# Patient Record
Sex: FEMALE | Race: WHITE | Employment: UNEMPLOYED | URBAN - METROPOLITAN AREA
[De-identification: names, ages, dates, MRNs, and addresses within clinical notes are randomized per-mention and may not be internally consistent; named-entity substitution may affect disease eponyms.]

---
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
## 2023-02-08 ENCOUNTER — HOSPITAL ENCOUNTER (EMERGENCY)
Facility: HOSPITAL | Age: 18
Discharge: HOME | End: 2023-02-08
Attending: EMERGENCY MEDICINE
Payer: MEDICAID

## 2023-02-08 VITALS
OXYGEN SATURATION: 97 % | HEART RATE: 93 BPM | BODY MASS INDEX: 51 KG/M2 | WEIGHT: 293 LBS | DIASTOLIC BLOOD PRESSURE: 55 MMHG | TEMPERATURE: 100 F | SYSTOLIC BLOOD PRESSURE: 112 MMHG | RESPIRATION RATE: 20 BRPM

## 2023-02-08 DIAGNOSIS — J02.0 STREP PHARYNGITIS: Primary | ICD-10-CM

## 2023-02-08 LAB — S PYO AG THROAT QL: POSITIVE

## 2023-02-08 PROCEDURE — 99283 EMERGENCY DEPT VISIT LOW MDM: CPT

## 2023-02-08 PROCEDURE — 87880 STREP A ASSAY W/OPTIC: CPT

## 2023-02-08 RX ORDER — PENICILLIN V POTASSIUM 500 MG/1
500 TABLET ORAL 3 TIMES DAILY
Qty: 30 TABLET | Refills: 0 | Status: SHIPPED | OUTPATIENT
Start: 2023-02-08 | End: 2023-02-18

## 2023-02-08 RX ORDER — IBUPROFEN 400 MG/1
400 TABLET ORAL ONCE
Status: COMPLETED | OUTPATIENT
Start: 2023-02-08 | End: 2023-02-08

## 2023-02-08 NOTE — ED INITIAL ASSESSMENT (HPI)
Patient complains of throat pain since yesterday, states there is a \"bump\" on the left side, also complains of chills

## 2023-02-08 NOTE — DISCHARGE INSTRUCTIONS
Take antibiotics as prescribed. Take ibuprofen or Tylenol as needed for pain or fever. Follow-up with your primary physician. Return to the emergency department if difficulty swallowing, difficulty breathing, or other new symptoms develop.

## 2024-06-16 ENCOUNTER — HOSPITAL ENCOUNTER (EMERGENCY)
Facility: HOSPITAL | Age: 19
Discharge: HOME | End: 2024-06-16
Attending: EMERGENCY MEDICINE

## 2024-06-16 ENCOUNTER — APPOINTMENT (OUTPATIENT)
Dept: GENERAL RADIOLOGY | Facility: HOSPITAL | Age: 19
End: 2024-06-16
Attending: EMERGENCY MEDICINE

## 2024-06-16 VITALS
SYSTOLIC BLOOD PRESSURE: 110 MMHG | OXYGEN SATURATION: 98 % | RESPIRATION RATE: 18 BRPM | BODY MASS INDEX: 50.02 KG/M2 | DIASTOLIC BLOOD PRESSURE: 65 MMHG | TEMPERATURE: 98 F | HEART RATE: 85 BPM | HEIGHT: 64 IN | WEIGHT: 293 LBS

## 2024-06-16 DIAGNOSIS — S29.011A MUSCLE STRAIN OF CHEST WALL, INITIAL ENCOUNTER: Primary | ICD-10-CM

## 2024-06-16 PROCEDURE — 71101 X-RAY EXAM UNILAT RIBS/CHEST: CPT | Performed by: EMERGENCY MEDICINE

## 2024-06-16 PROCEDURE — 99283 EMERGENCY DEPT VISIT LOW MDM: CPT

## 2024-06-16 PROCEDURE — 99284 EMERGENCY DEPT VISIT MOD MDM: CPT

## 2024-06-16 RX ORDER — NAPROXEN 500 MG/1
500 TABLET ORAL 2 TIMES DAILY PRN
Qty: 14 TABLET | Refills: 0 | Status: SHIPPED | OUTPATIENT
Start: 2024-06-16 | End: 2024-06-23

## 2024-06-16 NOTE — ED PROVIDER NOTES
Patient Seen in: [facility] Emergency Department      History     Chief Complaint   Patient presents with    Pain     Stated Complaint: r side rib pain    Subjective:   HPI    Patient is a 18-year-old female that complains of right-sided rib pain.  Is been present for about 2 weeks.  She is she is tender in the right lateral ribs.  Pain seems to radiate underneath her right breast.  It is worse when she moves it is worse when she twists it is worse when she coughs  Patient states about 2 weeks ago when it started she had a bad cough.  She smokes and coughs frequently.  She recalls no other injury other than bad cough.    Objective:   History reviewed. No pertinent past medical history.           History reviewed. No pertinent surgical history.             Social History     Socioeconomic History    Marital status: Single   Tobacco Use    Smoking status: Never    Smokeless tobacco: Never   Substance and Sexual Activity    Drug use: Yes     Types: Cannabis              Review of Systems    Positive for stated complaint: r side rib pain  Other systems are as noted in HPI.  Constitutional and vital signs reviewed.      All other systems reviewed and negative except as noted above.    Physical Exam     ED Triage Vitals [06/16/24 1058]   /76   Pulse 90   Resp 16   Temp 98 °F (36.7 °C)   Temp src Temporal   SpO2 97 %   O2 Device None (Room air)       Current Vitals:   Vital Signs  BP: 104/76  Pulse: 90  Resp: 16  Temp: 98 °F (36.7 °C)  Temp src: Temporal    Oxygen Therapy  SpO2: 97 %  O2 Device: None (Room air)            Physical Exam    Constitutional: Oriented to person, place, and time. Appears well-developed and well-nourished.   HEENT:   Head: Normocephalic and atraumatic.   Right Ear: External ear normal.   Left Ear: External ear normal.   Nose: Nose normal.   Mouth/Throat: Oropharynx is clear and moist.   Eyes: Conjunctivae and EOM are normal. Pupils are equal, round, and reactive to light.   Neck:  Neck supple.   Cardiovascular: Normal rate, regular rhythm, normal heart sounds and intact distal pulses.    Pulmonary/Chest: Effort normal and breath sounds normal. No respiratory distress.  Tender right lateral rib cage.  Abdominal: Soft. Bowel sounds are normal. Exhibits no distension and no mass. There is no tenderness. There is no rebound and no guarding.   Musculoskeletal: Normal range of motion. Exhibits no edema or tenderness.   Lymphadenopathy: No cervical adenopathy.   Neurological: Alert and oriented to person, place, and time. Normal reflexes. No cranial nerve deficit. No motor os sensory defecits noted Coordination normal.   Skin: Skin is warm and dry.   Psychiatric: Normal mood and affect. Behavior is normal. Judgment and thought content normal.   Nursing note and vitals reviewed.        ED Course   Labs Reviewed - No data to display                   MDM      Use of independent historian:     I personally reviewed and interpreted the images :     XR RIBS WITH CHEST (3 VIEWS), RIGHT  (CPT=71101)    Result Date: 6/16/2024  CONCLUSION: No acute cardiopulmonary abnormality.  No acute right rib fracture or deformity.   Dictated by (CST): Saji Oswald MD on 6/16/2024 at 12:23 PM     Finalized by (CST): Saji Oswald MD on 6/16/2024 at 12:24 PM           Vitals:    06/16/24 1058   BP: 104/76   Pulse: 90   Resp: 16   Temp: 98 °F (36.7 °C)   TempSrc: Temporal   SpO2: 97%   Weight: 136.1 kg   Height: 162.6 cm (5' 4\")     *I personally reviewed and interpreted all ED vitals.    Pulse Ox: 97%, Room air, Normal         Differential Diagnosis/ Diagnostic Considerations: Right-sided chest wall pain consider muscle strain consider rib fracture doubt pneumonia doubt pneumothorax will check x-rays    Medical Record Review: I personally reviewed available prior medical records for any recent pertinent discharge summaries, testing, and procedures and reviewed those reports and found .    Complicating Factors: The patient  already has smoking history which contribute to the complexity of this ED evaluation.    Social determinants of health:    Prescription drug management:      Shared Decision Making:    ED Course: X-ray findings shared with patient will discharge home with prescription for Naprosyn patient is in agreement with plan warning signs on return discussed will discharge    Discussion of management with other healthcare providers:    Condition upon leaving the department: Stable                                     Medical Decision Making      Disposition and Plan     Clinical Impression:  1. Muscle strain of chest wall, initial encounter         Disposition:  Discharge  6/16/2024 12:43 pm    Follow-up:  Colten Marcos MD  [address] [place] [id number]  [phone]    Follow up in 3 day(s)            Medications Prescribed:  Current Discharge Medication List        START taking these medications    Details   naproxen 500 MG Oral Tab Take 1 tablet (500 mg total) by mouth 2 (two) times daily as needed.  Qty: 14 tablet, Refills: 0

## 2024-11-24 ENCOUNTER — HOSPITAL ENCOUNTER (EMERGENCY)
Facility: HOSPITAL | Age: 19
Discharge: HOME | End: 2024-11-25
Attending: EMERGENCY MEDICINE
Payer: MEDICAID

## 2024-11-24 DIAGNOSIS — K29.00 ACUTE GASTRITIS WITHOUT HEMORRHAGE, UNSPECIFIED GASTRITIS TYPE: Primary | ICD-10-CM

## 2024-11-24 LAB
B-HCG UR QL: NEGATIVE
BILIRUB UR QL: NEGATIVE
CLARITY UR: CLEAR
COLOR UR: YELLOW
GLUCOSE UR-MCNC: NORMAL MG/DL
HGB UR QL STRIP.AUTO: NEGATIVE
KETONES UR-MCNC: 150 MG/DL
LEUKOCYTE ESTERASE UR QL STRIP.AUTO: NEGATIVE
NITRITE UR QL STRIP.AUTO: NEGATIVE
PH UR: 6 [PH] (ref 5–8)
SP GR UR STRIP: 1.02 (ref 1–1.03)
UROBILINOGEN UR STRIP-ACNC: NORMAL

## 2024-11-24 PROCEDURE — 96361 HYDRATE IV INFUSION ADD-ON: CPT

## 2024-11-24 PROCEDURE — 80048 BASIC METABOLIC PNL TOTAL CA: CPT | Performed by: EMERGENCY MEDICINE

## 2024-11-24 PROCEDURE — 96375 TX/PRO/DX INJ NEW DRUG ADDON: CPT

## 2024-11-24 PROCEDURE — 85060 BLOOD SMEAR INTERPRETATION: CPT | Performed by: EMERGENCY MEDICINE

## 2024-11-24 PROCEDURE — 99284 EMERGENCY DEPT VISIT MOD MDM: CPT

## 2024-11-24 PROCEDURE — 81025 URINE PREGNANCY TEST: CPT

## 2024-11-24 PROCEDURE — 85025 COMPLETE CBC W/AUTO DIFF WBC: CPT | Performed by: EMERGENCY MEDICINE

## 2024-11-24 PROCEDURE — 96374 THER/PROPH/DIAG INJ IV PUSH: CPT

## 2024-11-24 PROCEDURE — 81003 URINALYSIS AUTO W/O SCOPE: CPT | Performed by: EMERGENCY MEDICINE

## 2024-11-24 PROCEDURE — 83690 ASSAY OF LIPASE: CPT | Performed by: EMERGENCY MEDICINE

## 2024-11-24 PROCEDURE — 80076 HEPATIC FUNCTION PANEL: CPT | Performed by: EMERGENCY MEDICINE

## 2024-11-24 PROCEDURE — 99285 EMERGENCY DEPT VISIT HI MDM: CPT

## 2024-11-24 RX ORDER — ONDANSETRON 2 MG/ML
4 INJECTION INTRAMUSCULAR; INTRAVENOUS ONCE
Status: COMPLETED | OUTPATIENT
Start: 2024-11-24 | End: 2024-11-24

## 2024-11-24 RX ORDER — KETOROLAC TROMETHAMINE 15 MG/ML
15 INJECTION, SOLUTION INTRAMUSCULAR; INTRAVENOUS ONCE
Status: COMPLETED | OUTPATIENT
Start: 2024-11-24 | End: 2024-11-24

## 2024-11-24 RX ORDER — METOCLOPRAMIDE 10 MG/1
10 TABLET ORAL 3 TIMES DAILY PRN
Qty: 10 TABLET | Refills: 0 | Status: SHIPPED | OUTPATIENT
Start: 2024-11-24

## 2024-11-25 ENCOUNTER — APPOINTMENT (OUTPATIENT)
Dept: CT IMAGING | Facility: HOSPITAL | Age: 19
End: 2024-11-25
Attending: EMERGENCY MEDICINE
Payer: MEDICAID

## 2024-11-25 VITALS
BODY MASS INDEX: 48.65 KG/M2 | SYSTOLIC BLOOD PRESSURE: 123 MMHG | DIASTOLIC BLOOD PRESSURE: 77 MMHG | HEIGHT: 64 IN | RESPIRATION RATE: 20 BRPM | HEART RATE: 70 BPM | OXYGEN SATURATION: 100 % | TEMPERATURE: 99 F | WEIGHT: 285 LBS

## 2024-11-25 LAB
ALBUMIN SERPL-MCNC: 4.8 G/DL (ref 3.2–4.8)
ALP LIVER SERPL-CCNC: 97 U/L
ALT SERPL-CCNC: 75 U/L
ANION GAP SERPL CALC-SCNC: 10 MMOL/L (ref 0–18)
AST SERPL-CCNC: 15 U/L (ref ?–34)
BASOPHILS # BLD AUTO: 0.09 X10(3) UL (ref 0–0.2)
BASOPHILS NFR BLD AUTO: 0.6 %
BILIRUB DIRECT SERPL-MCNC: 0.6 MG/DL (ref ?–0.3)
BILIRUB SERPL-MCNC: 1.3 MG/DL (ref 0.3–1.2)
BUN BLD-MCNC: 10 MG/DL (ref 9–23)
BUN/CREAT SERPL: 10.5 (ref 10–20)
CALCIUM BLD-MCNC: 10.3 MG/DL (ref 8.7–10.4)
CHLORIDE SERPL-SCNC: 100 MMOL/L (ref 98–112)
CO2 SERPL-SCNC: 28 MMOL/L (ref 21–32)
CREAT BLD-MCNC: 0.95 MG/DL
DEPRECATED RDW RBC AUTO: 36.8 FL (ref 35.1–46.3)
EGFRCR SERPLBLD CKD-EPI 2021: 87 ML/MIN/1.73M2 (ref 60–?)
EOSINOPHIL # BLD AUTO: 0.07 X10(3) UL (ref 0–0.7)
EOSINOPHIL NFR BLD AUTO: 0.5 %
ERYTHROCYTE [DISTWIDTH] IN BLOOD BY AUTOMATED COUNT: 12.4 % (ref 11–15)
GLUCOSE BLD-MCNC: 106 MG/DL (ref 70–99)
HCT VFR BLD AUTO: 46.2 %
HGB BLD-MCNC: 15.7 G/DL
IMM GRANULOCYTES # BLD AUTO: 0.16 X10(3) UL (ref 0–1)
IMM GRANULOCYTES NFR BLD: 1 %
LIPASE SERPL-CCNC: 56 U/L (ref 12–53)
LYMPHOCYTES # BLD AUTO: 2.03 X10(3) UL (ref 1–4)
LYMPHOCYTES NFR BLD AUTO: 13.2 %
MCH RBC QN AUTO: 27.7 PG (ref 26–34)
MCHC RBC AUTO-ENTMCNC: 34 G/DL (ref 31–37)
MCV RBC AUTO: 81.5 FL
MONOCYTES # BLD AUTO: 1.03 X10(3) UL (ref 0.1–1)
MONOCYTES NFR BLD AUTO: 6.7 %
NEUTROPHILS # BLD AUTO: 12.01 X10 (3) UL (ref 1.5–7.7)
NEUTROPHILS # BLD AUTO: 12.01 X10(3) UL (ref 1.5–7.7)
NEUTROPHILS NFR BLD AUTO: 78 %
OSMOLALITY SERPL CALC.SUM OF ELEC: 285 MOSM/KG (ref 275–295)
PLATELET # BLD AUTO: 413 10(3)UL (ref 150–450)
POTASSIUM SERPL-SCNC: 3.6 MMOL/L (ref 3.5–5.1)
PROT SERPL-MCNC: 7.8 G/DL (ref 5.7–8.2)
RBC # BLD AUTO: 5.67 X10(6)UL
SODIUM SERPL-SCNC: 138 MMOL/L (ref 136–145)
WBC # BLD AUTO: 15.4 X10(3) UL (ref 4–11)

## 2024-11-25 PROCEDURE — 74177 CT ABD & PELVIS W/CONTRAST: CPT | Performed by: EMERGENCY MEDICINE

## 2024-11-25 RX ORDER — MAGNESIUM HYDROXIDE/ALUMINUM HYDROXICE/SIMETHICONE 120; 1200; 1200 MG/30ML; MG/30ML; MG/30ML
30 SUSPENSION ORAL ONCE
Status: COMPLETED | OUTPATIENT
Start: 2024-11-25 | End: 2024-11-25

## 2024-11-25 NOTE — ED INITIAL ASSESSMENT (HPI)
Drank heavily Monday, woke Tuesday with n/v for which was seen at a hospital in [place]. Pt was told her s/s are d/t marijuana use. Pt reports her symptoms have continued all week. Reports is keeping food down but still very nauseated.

## 2024-11-25 NOTE — ED PROVIDER NOTES
Patient Seen in: [facility] Emergency Department      History     Chief Complaint   Patient presents with    Nausea/vomiting    Body ache and/or chills     Stated Complaint: N/v fever    Subjective:   HPI      19-year-old female who uses marijuana daily who was in [place] recently where she has moved and  About a week ago started having upper GI symptoms tonight after heavy drinking with mostly cramping nausea vomiting and intolerance of p.o. fluids.  She went to local ER had some labs and urine done and was told it was possibly related to her marijuana use.  Patient doubts this and states this is never been a problem the past and she smokes every day.  She still has ongoing symptoms.  Mostly nausea.  No significant diarrhea.  No fever.  No abdominal surgical history.  No recent antibiotics or travel.  No heavy NSAID use.  They give her a oral nausea medicine at home she does not remember the name of but states it makes her not feel well so she only used it once.    Objective:     No pertinent past medical history.            No pertinent past surgical history.              No pertinent social history.                Physical Exam     ED Triage Vitals [11/24/24 2219]   /81   Pulse 114   Resp 22   Temp 98.5 °F (36.9 °C)   Temp src Oral   SpO2 96 %   O2 Device        Current Vitals:   Vital Signs  BP: 124/81  Pulse: 114  Resp: 22  Temp: 98.5 °F (36.9 °C)  Temp src: Oral    Oxygen Therapy  SpO2: 96 %        Physical Exam    Constitutional: Oriented to person, place, and time.  Appears well-developed. No distress.  Obese  Head: Normocephalic and atraumatic.   Eyes: Conjunctivae are normal. Pupils are equal, round, and reactive to light.   Cardiovascular: Normal rate, regular rhythm and intact distal pulses.    Pulmonary/Chest: Effort normal. No respiratory distress.   Abdominal: Soft.  Mostly focal epigastric tenderness.  No lower abdominal pain or guarding.  Musculoskeletal: Normal range of motion. No  edema or tenderness.   Neurological: Alert and oriented to person, place, and time.   Skin: Skin is warm and dry.   Nursing note and vitals reviewed.    Differential diagnosis includes alcohol gastritis or other nonspecific gastritis, peptic ulcer disease, disease, pancreatitis, cannabis hyperemesis syndrome, dehydration electrolyte abnormality and renal insufficiency.      ED Course     Labs Reviewed   BASIC METABOLIC PANEL (8) - Abnormal; Notable for the following components:       Result Value    Glucose 106 (*)     All other components within normal limits   HEPATIC FUNCTION PANEL (7) - Abnormal; Notable for the following components:    ALT 75 (*)     Bilirubin, Total 1.3 (*)     Bilirubin, Direct 0.6 (*)     All other components within normal limits   CBC WITH DIFFERENTIAL WITH PLATELET - Abnormal; Notable for the following components:    WBC 15.4 (*)     RBC 5.67 (*)     Neutrophil Absolute Prelim 12.01 (*)     All other components within normal limits   LIPASE - Abnormal; Notable for the following components:    Lipase 56 (*)     All other components within normal limits   URINALYSIS WITH CULTURE REFLEX - Abnormal; Notable for the following components:    Ketones Urine 150 (*)     Protein Urine Trace (*)     All other components within normal limits   POCT PREGNANCY URINE - Normal   MD BLOOD SMEAR CONSULT            CT ABDOMEN AND PELVIS WITH CONTRAST    COMPARISON: None.    IMPRESSION:  No acute abnormality in the abdomen or pelvis.    No appendicitis.  No diverticulitis.  Unremarkable gallbladder.  No obstructive urolithiasis.  No SBO.      Report finalized on 11/25/24 at 1:37 AM ET.      Dr. Chloé Galindo MD       [facility]              Medical Decision Making  Workup reassuring.  Patient looks well.  Given some fluids.  Will go on symptomatic therapy.  Avoidance of NSAIDs.  No spicy fatty or acidic foods for few days.  She will progress her diet.  Tylenol for pain.  She will come back if worsening or change.   No indication for admission to hospital this time    Problems Addressed:  Acute gastritis without hemorrhage, unspecified gastritis type: acute illness or injury with systemic symptoms    Amount and/or Complexity of Data Reviewed  Labs: ordered. Decision-making details documented in ED Course.  Radiology: ordered and independent interpretation performed. Decision-making details documented in ED Course.     Details:  by my review of the CT abdomen/pelvis there is no obvious evidence of bowel obstruction, free intraperitoneal air or significant free fluid.    Risk  OTC drugs.  Prescription drug management.  Decision regarding hospitalization.        Disposition and Plan     Clinical Impression:  1. Acute gastritis without hemorrhage, unspecified gastritis type         Disposition:  Discharge  11/25/2024  1:03 am    Follow-up:  YOUR PRIMARY CARE DOCTOR    Call            Medications Prescribed:  Current Discharge Medication List        START taking these medications    Details   metoclopramide 10 MG Oral Tab Take 1 tablet (10 mg total) by mouth 3 (three) times daily as needed.  Qty: 10 tablet, Refills: 0      omeprazole 20 MG Oral Capsule Delayed Release Take 1 capsule (20 mg total) by mouth daily for 14 days.  Qty: 14 capsule, Refills: 0                 Supplementary Documentation:

## 2025-02-09 ENCOUNTER — HOSPITAL ENCOUNTER (EMERGENCY)
Facility: HOSPITAL | Age: 20
Discharge: HOME | End: 2025-02-09
Attending: EMERGENCY MEDICINE
Payer: MEDICAID

## 2025-02-09 ENCOUNTER — APPOINTMENT (OUTPATIENT)
Dept: ULTRASOUND IMAGING | Facility: HOSPITAL | Age: 20
End: 2025-02-09
Attending: EMERGENCY MEDICINE
Payer: MEDICAID

## 2025-02-09 VITALS
DIASTOLIC BLOOD PRESSURE: 84 MMHG | TEMPERATURE: 98 F | HEART RATE: 71 BPM | OXYGEN SATURATION: 92 % | SYSTOLIC BLOOD PRESSURE: 124 MMHG | HEIGHT: 64 IN | RESPIRATION RATE: 16 BRPM | BODY MASS INDEX: 46.95 KG/M2 | WEIGHT: 275 LBS

## 2025-02-09 DIAGNOSIS — K83.8 BILIARY SLUDGE: Primary | ICD-10-CM

## 2025-02-09 LAB
ALBUMIN SERPL-MCNC: 5.1 G/DL (ref 3.2–4.8)
ALP LIVER SERPL-CCNC: 99 U/L
ALT SERPL-CCNC: 143 U/L
ANION GAP SERPL CALC-SCNC: 11 MMOL/L (ref 0–18)
AST SERPL-CCNC: 42 U/L (ref ?–34)
B-HCG UR QL: NEGATIVE
BASOPHILS # BLD AUTO: 0.07 X10(3) UL (ref 0–0.2)
BASOPHILS NFR BLD AUTO: 0.5 %
BILIRUB DIRECT SERPL-MCNC: 0.6 MG/DL (ref ?–0.3)
BILIRUB SERPL-MCNC: 1.4 MG/DL (ref 0.3–1.2)
BILIRUB UR QL: NEGATIVE
BUN BLD-MCNC: 8 MG/DL (ref 9–23)
BUN/CREAT SERPL: 8.4 (ref 10–20)
CALCIUM BLD-MCNC: 9.6 MG/DL (ref 8.7–10.4)
CHLORIDE SERPL-SCNC: 99 MMOL/L (ref 98–112)
CLARITY UR: CLEAR
CO2 SERPL-SCNC: 26 MMOL/L (ref 21–32)
COLOR UR: YELLOW
CREAT BLD-MCNC: 0.95 MG/DL
DEPRECATED RDW RBC AUTO: 35.6 FL (ref 35.1–46.3)
EGFRCR SERPLBLD CKD-EPI 2021: 87 ML/MIN/1.73M2 (ref 60–?)
EOSINOPHIL # BLD AUTO: 0.07 X10(3) UL (ref 0–0.7)
EOSINOPHIL NFR BLD AUTO: 0.5 %
ERYTHROCYTE [DISTWIDTH] IN BLOOD BY AUTOMATED COUNT: 12.3 % (ref 11–15)
FLUAV + FLUBV RNA SPEC NAA+PROBE: NEGATIVE
FLUAV + FLUBV RNA SPEC NAA+PROBE: NEGATIVE
GLUCOSE BLD-MCNC: 125 MG/DL (ref 70–99)
GLUCOSE UR-MCNC: NORMAL MG/DL
HCT VFR BLD AUTO: 49.1 %
HGB BLD-MCNC: 16.7 G/DL
HGB UR QL STRIP.AUTO: NEGATIVE
IMM GRANULOCYTES # BLD AUTO: 0.08 X10(3) UL (ref 0–1)
IMM GRANULOCYTES NFR BLD: 0.6 %
KETONES UR-MCNC: 80 MG/DL
LEUKOCYTE ESTERASE UR QL STRIP.AUTO: 250
LIPASE SERPL-CCNC: 45 U/L (ref 12–53)
LYMPHOCYTES # BLD AUTO: 1.96 X10(3) UL (ref 1–4)
LYMPHOCYTES NFR BLD AUTO: 14.3 %
MCH RBC QN AUTO: 27.3 PG (ref 26–34)
MCHC RBC AUTO-ENTMCNC: 34 G/DL (ref 31–37)
MCV RBC AUTO: 80.4 FL
MONOCYTES # BLD AUTO: 0.99 X10(3) UL (ref 0.1–1)
MONOCYTES NFR BLD AUTO: 7.2 %
NEUTROPHILS # BLD AUTO: 10.53 X10 (3) UL (ref 1.5–7.7)
NEUTROPHILS # BLD AUTO: 10.53 X10(3) UL (ref 1.5–7.7)
NEUTROPHILS NFR BLD AUTO: 76.9 %
NITRITE UR QL STRIP.AUTO: NEGATIVE
OSMOLALITY SERPL CALC.SUM OF ELEC: 282 MOSM/KG (ref 275–295)
PH UR: 7 [PH] (ref 5–8)
PLATELET # BLD AUTO: 643 10(3)UL (ref 150–450)
POTASSIUM SERPL-SCNC: 3.2 MMOL/L (ref 3.5–5.1)
PROT SERPL-MCNC: 8 G/DL (ref 5.7–8.2)
PROT UR-MCNC: NEGATIVE MG/DL
RBC # BLD AUTO: 6.11 X10(6)UL
RSV RNA SPEC NAA+PROBE: NEGATIVE
SARS-COV-2 RNA RESP QL NAA+PROBE: NOT DETECTED
SODIUM SERPL-SCNC: 136 MMOL/L (ref 136–145)
SP GR UR STRIP: 1.01 (ref 1–1.03)
UROBILINOGEN UR STRIP-ACNC: NORMAL
WBC # BLD AUTO: 13.7 X10(3) UL (ref 4–11)

## 2025-02-09 PROCEDURE — 0241U SARS-COV-2/FLU A AND B/RSV BY PCR (GENEXPERT): CPT | Performed by: EMERGENCY MEDICINE

## 2025-02-09 PROCEDURE — 96361 HYDRATE IV INFUSION ADD-ON: CPT

## 2025-02-09 PROCEDURE — 81025 URINE PREGNANCY TEST: CPT

## 2025-02-09 PROCEDURE — 80076 HEPATIC FUNCTION PANEL: CPT | Performed by: EMERGENCY MEDICINE

## 2025-02-09 PROCEDURE — 76705 ECHO EXAM OF ABDOMEN: CPT | Performed by: EMERGENCY MEDICINE

## 2025-02-09 PROCEDURE — 85025 COMPLETE CBC W/AUTO DIFF WBC: CPT | Performed by: EMERGENCY MEDICINE

## 2025-02-09 PROCEDURE — 83690 ASSAY OF LIPASE: CPT | Performed by: EMERGENCY MEDICINE

## 2025-02-09 PROCEDURE — 80048 BASIC METABOLIC PNL TOTAL CA: CPT | Performed by: EMERGENCY MEDICINE

## 2025-02-09 PROCEDURE — 81001 URINALYSIS AUTO W/SCOPE: CPT | Performed by: EMERGENCY MEDICINE

## 2025-02-09 PROCEDURE — 99284 EMERGENCY DEPT VISIT MOD MDM: CPT

## 2025-02-09 PROCEDURE — 96374 THER/PROPH/DIAG INJ IV PUSH: CPT

## 2025-02-09 PROCEDURE — 87086 URINE CULTURE/COLONY COUNT: CPT | Performed by: EMERGENCY MEDICINE

## 2025-02-09 RX ORDER — AMOXICILLIN 875 MG/1
875 TABLET, COATED ORAL 2 TIMES DAILY
COMMUNITY

## 2025-02-09 RX ORDER — ONDANSETRON 2 MG/ML
4 INJECTION INTRAMUSCULAR; INTRAVENOUS ONCE
Status: COMPLETED | OUTPATIENT
Start: 2025-02-09 | End: 2025-02-09

## 2025-02-09 RX ORDER — POTASSIUM CHLORIDE 1500 MG/1
40 TABLET, EXTENDED RELEASE ORAL ONCE
Status: COMPLETED | OUTPATIENT
Start: 2025-02-09 | End: 2025-02-09

## 2025-02-09 RX ORDER — CIPROFLOXACIN AND DEXAMETHASONE 3; 1 MG/ML; MG/ML
4 SUSPENSION/ DROPS AURICULAR (OTIC) 2 TIMES DAILY
COMMUNITY

## 2025-02-09 NOTE — ED INITIAL ASSESSMENT (HPI)
Rosetta arrived through triage with her boyfriend for c/o persistent nausea, vomiting, and heartburn over the past 2 weeks. States she was evaluated at [facility] ED on 2/3 and received \"IV fluids and nausea and heartburn medication.\" She was then evaluated at [facility] ED on 2/7 and started on Amoxicillin for an ear infection.

## 2025-02-09 NOTE — ED PROVIDER NOTES
Patient Seen in: [facility] Emergency Department      History     Chief Complaint   Patient presents with    Nausea/Vomiting/Diarrhea     Stated Complaint: Vomiting/ loss of appetite    Subjective:   HPI    19-year-old female presents for evaluation of nausea and vomiting.  Symptoms started about 10 days ago.  Emesis is nonbloody nonbilious.  No diarrhea.  No dysuria or hematuria but notes that her urine is darker in color.  No fevers.  No chest pain or dyspnea.  She was seen at 2 outside ERs, at the first visit she was discharged with Pepcid and Zofran.  Later on she was diagnosed with an ear infection and treated with Ciprodex drops and amoxicillin.  Abdominal surgical history.      Objective:     History reviewed. No pertinent past medical history.           History reviewed. No pertinent surgical history.             Social History     Socioeconomic History    Marital status: Single   Tobacco Use    Smoking status: Never    Smokeless tobacco: Never   Substance and Sexual Activity    Drug use: Yes     Types: Cannabis                  Physical Exam     ED Triage Vitals [02/09/25 0934]   /84   Pulse 107   Resp 24   Temp 98.1 °F (36.7 °C)   Temp src Temporal   SpO2 94 %   O2 Device None (Room air)       Current Vitals:   Vital Signs  BP: 124/84  Pulse: 71  Resp: 16  Temp: 98.1 °F (36.7 °C)  Temp src: Temporal  MAP (mmHg): 97    Oxygen Therapy  SpO2: 92 %  O2 Device: None (Room air)        Physical Exam  Vitals and nursing note reviewed.   Constitutional:       Appearance: She is well-developed.   HENT:      Head: Normocephalic and atraumatic.   Eyes:      Extraocular Movements: Extraocular movements intact.   Cardiovascular:      Rate and Rhythm: Normal rate and regular rhythm.      Heart sounds: Normal heart sounds.   Pulmonary:      Effort: Pulmonary effort is normal.      Breath sounds: Normal breath sounds.   Abdominal:      General: There is no distension.      Palpations: Abdomen is soft.       Tenderness: There is no abdominal tenderness. There is no right CVA tenderness or left CVA tenderness.   Musculoskeletal:         General: Normal range of motion.      Cervical back: Normal range of motion.   Skin:     General: Skin is warm.   Neurological:      Mental Status: She is alert.      Comments: No focal deficits       Differential diagnosis includes but is not limited to gastroenteritis, GERD, PUD, pancreatitis, cholelithiasis, cholecystitis, UTI    ED Course     Labs Reviewed   BASIC METABOLIC PANEL (8) - Abnormal; Notable for the following components:       Result Value    Glucose 125 (*)     Potassium 3.2 (*)     BUN 8 (*)     BUN/CREA Ratio 8.4 (*)     All other components within normal limits   HEPATIC FUNCTION PANEL (7) - Abnormal; Notable for the following components:    AST 42 (*)      (*)     Bilirubin, Total 1.4 (*)     Bilirubin, Direct 0.6 (*)     Albumin 5.1 (*)     All other components within normal limits   CBC WITH DIFFERENTIAL WITH PLATELET - Abnormal; Notable for the following components:    WBC 13.7 (*)     RBC 6.11 (*)     HGB 16.7 (*)     HCT 49.1 (*)     .0 (*)     Neutrophil Absolute Prelim 10.53 (*)     Neutrophil Absolute 10.53 (*)     All other components within normal limits   URINALYSIS WITH CULTURE REFLEX - Abnormal; Notable for the following components:    Ketones Urine 80 (*)     Leukocyte Esterase Urine 250 (*)     Bacteria Urine Rare (*)     Squamous Epi. Cells Few (*)     All other components within normal limits   LIPASE - Normal   POCT PREGNANCY URINE - Normal   SARS-COV-2/FLU A AND B/RSV BY PCR (GENEXPERT) - Normal    Narrative:     This test is intended for the qualitative detection and differentiation of SARS-CoV-2, influenza A, influenza B, and respiratory syncytial virus (RSV) viral RNA in nasopharyngeal or nares swabs from individuals suspected of respiratory viral infection consistent with COVID-19 by their healthcare provider. Signs and symptoms of  respiratory viral infection due to SARS-CoV-2, influenza, and RSV can be similar.    Test performed using the Xpert Xpress SARS-CoV-2/FLU/RSV (real time RT-PCR)  assay on the [employer] instrument, [employer], [place] [address].   This test is being used under the Food and Drug Administration's Emergency Use Authorization.    The authorized Fact Sheet for Healthcare Providers for this assay is available upon request from the laboratory.   URINE CULTURE, ROUTINE            US GALLBLADDER (CPT=76705)    Result Date: 2/9/2025  CONCLUSION:   Gallbladder sludge without evidence of acute cholecystitis.  No biliary ductal dilatation.    Dictated by (CST): Davie Nelson MD on 2/09/2025 at 12:13 PM     Finalized by (CST): Davie Nelson MD on 2/09/2025 at 12:15 PM                MDM              Medical Decision Making  Vitals are stable in the ED.  Patient is sitting up, ambulatory, speaking in full sentences.  BMP shows mild hypokalemia with normal renal function.  CBC shows leukocytosis and thrombocytosis.  Hepatic function panel shows mild elevations in AST and ALT with total bilirubin of 1.4.  She has no dedicated right upper quadrant pain however with these new findings right upper quadrant ultrasound was ordered.  She is feeling improved after Zofran, GI cocktail and normal saline bolus.  I do feel she is stable for discharge home with supportive care, ongoing Zofran as needed and bland diet.  Return precautions provided and discussed.    Problems Addressed:  Biliary sludge: complicated acute illness or injury with systemic symptoms    Amount and/or Complexity of Data Reviewed  External Data Reviewed: labs.     Details: Reviewed CMP from outside hospital on 2/3/2025 which showed normal AST, ALT and bilirubin.  Labs: ordered. Decision-making details documented in ED Course.  Radiology: ordered and independent interpretation performed. Decision-making details documented in ED Course.    Risk  Prescription drug  management.        Disposition and Plan     Clinical Impression:  1. Biliary sludge         Disposition:  Discharge  2/9/2025 12:38 pm    Follow-up:  Cynthia Tomlinson MD  [facility] [address] [id number] [phone]    Follow up  PCP you can call to follow up          Medications Prescribed:  Discharge Medication List as of 2/9/2025  1:17 PM              Supplementary Documentation:

## 2025-02-09 NOTE — DISCHARGE INSTRUCTIONS
Your liver enzymes were mildly elevated today and should be monitored by your primary care doctor.  Stick to a bland diet.  May continue the Pepcid as well as the antinausea medicine as needed.    Return to the emergency department if you develop severe abdominal pain, severe nausea and vomiting to the point where you are unable to keep down fluids, if you develop chest pain or difficulty breathing, blood in your stool, dizziness or fainting, or if you develop any other new or concerning symptoms as these could be signs of more serious medical illness.  Try to stay well-hydrated.

## 2025-02-10 ENCOUNTER — HOSPITAL ENCOUNTER (EMERGENCY)
Facility: HOSPITAL | Age: 20
Discharge: HOME | End: 2025-02-11
Attending: EMERGENCY MEDICINE
Payer: MEDICAID

## 2025-02-10 ENCOUNTER — APPOINTMENT (OUTPATIENT)
Dept: CT IMAGING | Facility: HOSPITAL | Age: 20
End: 2025-02-10
Attending: EMERGENCY MEDICINE
Payer: MEDICAID

## 2025-02-10 DIAGNOSIS — R11.2 NAUSEA AND VOMITING, UNSPECIFIED VOMITING TYPE: Primary | ICD-10-CM

## 2025-02-10 LAB
ALBUMIN SERPL-MCNC: 4.6 G/DL (ref 3.2–4.8)
ALP LIVER SERPL-CCNC: 90 U/L
ALT SERPL-CCNC: 127 U/L
ANION GAP SERPL CALC-SCNC: 10 MMOL/L (ref 0–18)
AST SERPL-CCNC: 38 U/L (ref ?–34)
B-HCG SERPL-ACNC: <2.6 MIU/ML
BASOPHILS # BLD AUTO: 0.05 X10(3) UL (ref 0–0.2)
BASOPHILS NFR BLD AUTO: 0.4 %
BILIRUB DIRECT SERPL-MCNC: 0.5 MG/DL (ref ?–0.3)
BILIRUB SERPL-MCNC: 1 MG/DL (ref 0.3–1.2)
BUN BLD-MCNC: 8 MG/DL (ref 9–23)
BUN/CREAT SERPL: 8.9 (ref 10–20)
CALCIUM BLD-MCNC: 9.3 MG/DL (ref 8.7–10.4)
CHLORIDE SERPL-SCNC: 101 MMOL/L (ref 98–112)
CO2 SERPL-SCNC: 26 MMOL/L (ref 21–32)
CREAT BLD-MCNC: 0.9 MG/DL
DEPRECATED RDW RBC AUTO: 35.1 FL (ref 35.1–46.3)
EGFRCR SERPLBLD CKD-EPI 2021: 93 ML/MIN/1.73M2 (ref 60–?)
EOSINOPHIL # BLD AUTO: 0.02 X10(3) UL (ref 0–0.7)
EOSINOPHIL NFR BLD AUTO: 0.1 %
ERYTHROCYTE [DISTWIDTH] IN BLOOD BY AUTOMATED COUNT: 12.3 % (ref 11–15)
GLUCOSE BLD-MCNC: 137 MG/DL (ref 70–99)
HCT VFR BLD AUTO: 42.5 %
HGB BLD-MCNC: 15 G/DL
IMM GRANULOCYTES # BLD AUTO: 0.05 X10(3) UL (ref 0–1)
IMM GRANULOCYTES NFR BLD: 0.4 %
LIPASE SERPL-CCNC: 45 U/L (ref 12–53)
LYMPHOCYTES # BLD AUTO: 1.82 X10(3) UL (ref 1–4)
LYMPHOCYTES NFR BLD AUTO: 12.8 %
MCH RBC QN AUTO: 28.1 PG (ref 26–34)
MCHC RBC AUTO-ENTMCNC: 35.3 G/DL (ref 31–37)
MCV RBC AUTO: 79.6 FL
MONOCYTES # BLD AUTO: 0.89 X10(3) UL (ref 0.1–1)
MONOCYTES NFR BLD AUTO: 6.3 %
NEUTROPHILS # BLD AUTO: 11.41 X10 (3) UL (ref 1.5–7.7)
NEUTROPHILS # BLD AUTO: 11.41 X10(3) UL (ref 1.5–7.7)
NEUTROPHILS NFR BLD AUTO: 80 %
OSMOLALITY SERPL CALC.SUM OF ELEC: 284 MOSM/KG (ref 275–295)
PLATELET # BLD AUTO: 478 10(3)UL (ref 150–450)
POTASSIUM SERPL-SCNC: 3.7 MMOL/L (ref 3.5–5.1)
PROT SERPL-MCNC: 7.3 G/DL (ref 5.7–8.2)
RBC # BLD AUTO: 5.34 X10(6)UL
SODIUM SERPL-SCNC: 137 MMOL/L (ref 136–145)
WBC # BLD AUTO: 14.2 X10(3) UL (ref 4–11)

## 2025-02-10 PROCEDURE — 96361 HYDRATE IV INFUSION ADD-ON: CPT

## 2025-02-10 PROCEDURE — 85025 COMPLETE CBC W/AUTO DIFF WBC: CPT | Performed by: EMERGENCY MEDICINE

## 2025-02-10 PROCEDURE — 80048 BASIC METABOLIC PNL TOTAL CA: CPT | Performed by: EMERGENCY MEDICINE

## 2025-02-10 PROCEDURE — 83690 ASSAY OF LIPASE: CPT | Performed by: EMERGENCY MEDICINE

## 2025-02-10 PROCEDURE — 80076 HEPATIC FUNCTION PANEL: CPT | Performed by: EMERGENCY MEDICINE

## 2025-02-10 PROCEDURE — 99285 EMERGENCY DEPT VISIT HI MDM: CPT

## 2025-02-10 PROCEDURE — 99284 EMERGENCY DEPT VISIT MOD MDM: CPT

## 2025-02-10 PROCEDURE — 96375 TX/PRO/DX INJ NEW DRUG ADDON: CPT

## 2025-02-10 PROCEDURE — 74177 CT ABD & PELVIS W/CONTRAST: CPT | Performed by: EMERGENCY MEDICINE

## 2025-02-10 PROCEDURE — 84702 CHORIONIC GONADOTROPIN TEST: CPT | Performed by: EMERGENCY MEDICINE

## 2025-02-10 PROCEDURE — 96374 THER/PROPH/DIAG INJ IV PUSH: CPT

## 2025-02-10 PROCEDURE — S0028 INJECTION, FAMOTIDINE, 20 MG: HCPCS | Performed by: EMERGENCY MEDICINE

## 2025-02-10 RX ORDER — DIPHENHYDRAMINE HYDROCHLORIDE 50 MG/ML
25 INJECTION INTRAMUSCULAR; INTRAVENOUS ONCE
Status: COMPLETED | OUTPATIENT
Start: 2025-02-10 | End: 2025-02-10

## 2025-02-10 RX ORDER — FAMOTIDINE 10 MG/ML
20 INJECTION, SOLUTION INTRAVENOUS ONCE
Status: COMPLETED | OUTPATIENT
Start: 2025-02-10 | End: 2025-02-10

## 2025-02-10 RX ORDER — FAMOTIDINE 20 MG/1
20 TABLET, FILM COATED ORAL 2 TIMES DAILY PRN
Qty: 30 TABLET | Refills: 0 | Status: SHIPPED | OUTPATIENT
Start: 2025-02-10 | End: 2025-03-12

## 2025-02-10 RX ORDER — ONDANSETRON 4 MG/1
4 TABLET, ORALLY DISINTEGRATING ORAL EVERY 8 HOURS PRN
Qty: 10 TABLET | Refills: 0 | Status: SHIPPED | OUTPATIENT
Start: 2025-02-10 | End: 2025-02-17

## 2025-02-10 RX ORDER — METOCLOPRAMIDE 10 MG/1
10 TABLET ORAL EVERY 6 HOURS PRN
Qty: 20 TABLET | Refills: 0 | Status: SHIPPED | OUTPATIENT
Start: 2025-02-10

## 2025-02-10 RX ORDER — DROPERIDOL 2.5 MG/ML
1.25 INJECTION, SOLUTION INTRAMUSCULAR; INTRAVENOUS ONCE
Status: COMPLETED | OUTPATIENT
Start: 2025-02-10 | End: 2025-02-10

## 2025-02-10 RX ORDER — METOCLOPRAMIDE HYDROCHLORIDE 5 MG/ML
10 INJECTION INTRAMUSCULAR; INTRAVENOUS ONCE
Status: COMPLETED | OUTPATIENT
Start: 2025-02-10 | End: 2025-02-10

## 2025-02-11 VITALS
HEART RATE: 100 BPM | OXYGEN SATURATION: 98 % | BODY MASS INDEX: 46.95 KG/M2 | TEMPERATURE: 99 F | DIASTOLIC BLOOD PRESSURE: 94 MMHG | WEIGHT: 275 LBS | RESPIRATION RATE: 16 BRPM | SYSTOLIC BLOOD PRESSURE: 157 MMHG | HEIGHT: 64 IN

## 2025-02-11 NOTE — DISCHARGE INSTRUCTIONS
Please take the medications as prescribed as needed.  No smoking marijuana, no alcohol, no spicy foods.  Please follow-up with GI     Return back to ER with any worsening concerns

## 2025-02-11 NOTE — ED INITIAL ASSESSMENT (HPI)
Pt arrives through triage with       complaints of abd pain, nausea and feels like her \"stomach is on fire\".    Was seen here yesterday for the same issue.  Reports prescribed meds not helping with her symptoms

## 2025-02-11 NOTE — ED PROVIDER NOTES
Patient Seen in: [facility] Emergency Department      History     Chief Complaint   Patient presents with    Nausea/vomiting     Stated Complaint: Bounceback, Vomiting    Subjective:   HPI      19-year-old female presents with abdominal pain, nausea, vomiting.  Patient reports roughly 10 days of symptoms, intermittent.  Reports she vomited about 6 times today.  She reports previous heavy marijuana and alcohol use, none recent.  Had some loose stools today.  Denies cough, chest pain. Abdominal pain is diffuse but worse over lower aspect    Objective:     History reviewed. No pertinent past medical history.           History reviewed. No pertinent surgical history.             Social History     Socioeconomic History    Marital status: Single   Tobacco Use    Smoking status: Never    Smokeless tobacco: Never   Vaping Use    Vaping status: Never Used   Substance and Sexual Activity    Alcohol use: Never    Drug use: Yes     Types: Cannabis                  Physical Exam     ED Triage Vitals [02/10/25 2037]   /79   Pulse 108   Resp 18   Temp 98.9 °F (37.2 °C)   Temp src Temporal   SpO2 98 %   O2 Device None (Room air)       Current Vitals:   Vital Signs  BP: 114/71  Pulse: 98  Resp: 16  Temp: 98.9 °F (37.2 °C)  Temp src: Temporal  MAP (mmHg): 82    Oxygen Therapy  SpO2: 98 %  O2 Device: None (Room air)        Physical Exam  Vital signs reviewed. Nursing note reviewed.  Constitutional: Well-developed. Well-nourished. In no acute distress  HENT: Mucous membranes moist.   EYES: No scleral icterus or conjunctival injection.  NECK: Full ROM. Supple.   CARDIAC: Normal rate. Normal S1/ S2. 2+ distal pulses. No edema  PULM/CHEST: Clear to auscultation bilaterally. No wheezes  ABD: Soft, non-tender, non-distended.   RECTAL: deferred  Extremities: No obvious deformity  NEURO: Awake, alert, following commands, moving extremities, answering questions.   SKIN: Warm and dry. No rash or lesions.  PSYCH: Normal judgment.  Normal affect.        ED Course     Labs Reviewed   BASIC METABOLIC PANEL (8) - Abnormal; Notable for the following components:       Result Value    Glucose 137 (*)     BUN 8 (*)     BUN/CREA Ratio 8.9 (*)     All other components within normal limits   HEPATIC FUNCTION PANEL (7) - Abnormal; Notable for the following components:    AST 38 (*)      (*)     Bilirubin, Direct 0.5 (*)     All other components within normal limits   CBC WITH DIFFERENTIAL WITH PLATELET - Abnormal; Notable for the following components:    WBC 14.2 (*)     RBC 5.34 (*)     MCV 79.6 (*)     .0 (*)     Neutrophil Absolute Prelim 11.41 (*)     Neutrophil Absolute 11.41 (*)     All other components within normal limits   LIPASE - Normal   HCG, BETA SUBUNIT (QUANT PREGNANCY TEST) - Normal                   MDM      Assessment:Patient is a 19 year old female presenting to the ED due to abdominal pain, vomiting.    Comorbidities/chronic illnesses impacting care: none    History obtained from: patient    External records and previous hospitalization records reviewed and documented below    Consideration of Social Determinants of Health and Impact on Medical Decision Making:  Housing/Transportation/Financial Strain/Access to healthcare/Food insecurity/family or Community support/Language and Literacy/Substance abuse/Mental health concerns/Disabilities     -substance use (marijuana)    Radiography/Imaging:  CT ABDOMEN+PELVIS(CONTRAST ONLY)(CPT=74177)    (Results Pending)           ED course  Patient arrives here with vital signs normal.  This is her fourth ED visit in the last 7 days for same.  Complaining of persistent abdominal pain, nausea, vomiting, today with some loose stools.  Reviewed her prior ED visits.  Will progress workup, repeat abdominal labs, CT abdomen pelvis, antiemetics, fluids    Laboratory results above were independently viewed and interpreted as: Mild leukocytosis, mild elevation of LFTs though improved from  yesterday  Radiology: ordered and independently interpreted as: No bowel obstruction, no free fluid in abdomen    CT abdomen and pelvis with contrast    COMPARISON: 11/25/2024.    IMPRESSION:  No acute abnormality in the abdomen or pelvis.    No appendicitis.  No diverticulitis.  No acute biliary pathology.  No obstructive urolithiasis.  No SBO.  Small hiatal hernia, increases prior.    Per radiology, CT negative.  On reassessment, patient feels much better.  Gave option for admission due to recurrent ER visits, though she states she feels better and will discharge home.  Will prescribe Zofran, Reglan, Pepcid, and encouraged GI follow-up            Medications   metoclopramide (Reglan) 5 mg/mL injection 10 mg (10 mg Intravenous Given 2/10/25 2223)   diphenhydrAMINE (Benadryl) 50 mg/mL  injection 25 mg (25 mg Intravenous Given 2/10/25 2224)   droperidol (Inapsine) 2.5 mg/mL injection 1.25 mg (1.25 mg Intravenous Given 2/10/25 2225)   sodium chloride 0.9 % IV bolus 1,000 mL (0 mL Intravenous Stopped 2/10/25 2347)   famotidine (Pepcid) 20 mg/2mL injection 20 mg (20 mg Intravenous Given 2/10/25 2224)   iopamidol 76% (ISOVUE-370) injection for power injector (80 mL Intravenous Given 2/10/25 2250)                 Medical Decision Making      Disposition and Plan     Clinical Impression:  1. Nausea and vomiting, unspecified vomiting type         Disposition:  Discharge  2/10/2025 11:49 pm    Follow-up:  [facility] Emergency Department  [address]  [facility] [address]  [phone]  Follow up  If symptoms worsen    Jarrett Patino MD  [address]  [facility] [address]  [phone]    Schedule an appointment as soon as possible for a visit            Medications Prescribed:  Current Discharge Medication List        START taking these medications    Details   ondansetron 4 MG Oral Tablet Dispersible Take 1 tablet (4 mg total) by mouth every 8 (eight) hours as needed for Nausea.  Qty: 10  tablet, Refills: 0      !! metoclopramide 10 MG Oral Tab Take 1 tablet (10 mg total) by mouth every 6 (six) hours as needed.  Qty: 20 tablet, Refills: 0      famotidine (PEPCID) 20 MG Oral Tab Take 1 tablet (20 mg total) by mouth 2 (two) times daily as needed for Heartburn.  Qty: 30 tablet, Refills: 0       !! - Potential duplicate medications found. Please discuss with provider.              Supplementary Documentation:

## 2025-07-26 ENCOUNTER — HOSPITAL ENCOUNTER (EMERGENCY)
Facility: HOSPITAL | Age: 20
Discharge: HOME | End: 2025-07-26
Attending: EMERGENCY MEDICINE

## 2025-07-26 ENCOUNTER — APPOINTMENT (OUTPATIENT)
Dept: ULTRASOUND IMAGING | Facility: HOSPITAL | Age: 20
End: 2025-07-26
Attending: EMERGENCY MEDICINE

## 2025-07-26 VITALS
TEMPERATURE: 98 F | OXYGEN SATURATION: 95 % | WEIGHT: 275 LBS | RESPIRATION RATE: 14 BRPM | HEART RATE: 51 BPM | BODY MASS INDEX: 46.95 KG/M2 | DIASTOLIC BLOOD PRESSURE: 84 MMHG | HEIGHT: 64 IN | SYSTOLIC BLOOD PRESSURE: 129 MMHG

## 2025-07-26 DIAGNOSIS — R11.2 NAUSEA AND VOMITING, UNSPECIFIED VOMITING TYPE: Primary | ICD-10-CM

## 2025-07-26 LAB
ALBUMIN SERPL-MCNC: 5.1 G/DL (ref 3.2–4.8)
ALBUMIN/GLOB SERPL: 2 (ref 1–2)
ALP LIVER SERPL-CCNC: 97 U/L (ref 52–144)
ALT SERPL-CCNC: 98 U/L (ref 10–49)
ANION GAP SERPL CALC-SCNC: 9 MMOL/L (ref 0–18)
AST SERPL-CCNC: 52 U/L (ref ?–34)
B-HCG UR QL: NEGATIVE
BASOPHILS # BLD AUTO: 0.1 X10(3) UL (ref 0–0.2)
BASOPHILS NFR BLD AUTO: 0.6 %
BILIRUB SERPL-MCNC: 1.7 MG/DL (ref 0.3–1.2)
BILIRUB UR QL CFM: POSITIVE
BUN BLD-MCNC: 9 MG/DL (ref 9–23)
BUN/CREAT SERPL: 9.9 (ref 10–20)
CALCIUM BLD-MCNC: 9.4 MG/DL (ref 8.7–10.4)
CHLORIDE SERPL-SCNC: 102 MMOL/L (ref 98–112)
CO2 SERPL-SCNC: 25 MMOL/L (ref 21–32)
COLOR UR: YELLOW
CREAT BLD-MCNC: 0.91 MG/DL (ref 0.55–1.02)
DEPRECATED RDW RBC AUTO: 34.5 FL (ref 35.1–46.3)
EGFRCR SERPLBLD CKD-EPI 2021: 91 ML/MIN/1.73M2 (ref 60–?)
EOSINOPHIL # BLD AUTO: 0.05 X10(3) UL (ref 0–0.7)
EOSINOPHIL NFR BLD AUTO: 0.3 %
ERYTHROCYTE [DISTWIDTH] IN BLOOD BY AUTOMATED COUNT: 12.2 % (ref 11–15)
GLOBULIN PLAS-MCNC: 2.6 G/DL (ref 2–3.5)
GLUCOSE BLD-MCNC: 117 MG/DL (ref 70–99)
GLUCOSE UR-MCNC: NORMAL MG/DL
HCT VFR BLD AUTO: 45.4 % (ref 35–48)
HGB BLD-MCNC: 16.4 G/DL (ref 12–16)
HGB UR QL STRIP.AUTO: NEGATIVE
IMM GRANULOCYTES # BLD AUTO: 0.09 X10(3) UL (ref 0–1)
IMM GRANULOCYTES NFR BLD: 0.6 %
KETONES UR-MCNC: >150 MG/DL
LEUKOCYTE ESTERASE UR QL STRIP.AUTO: 75
LIPASE SERPL-CCNC: 42 U/L (ref 12–53)
LYMPHOCYTES # BLD AUTO: 2.45 X10(3) UL (ref 1–4)
LYMPHOCYTES NFR BLD AUTO: 15.1 %
MCH RBC QN AUTO: 28.3 PG (ref 26–34)
MCHC RBC AUTO-ENTMCNC: 36.1 G/DL (ref 31–37)
MCV RBC AUTO: 78.4 FL (ref 80–100)
MONOCYTES # BLD AUTO: 0.87 X10(3) UL (ref 0.1–1)
MONOCYTES NFR BLD AUTO: 5.4 %
NEUTROPHILS # BLD AUTO: 12.64 X10 (3) UL (ref 1.5–7.7)
NEUTROPHILS # BLD AUTO: 12.64 X10(3) UL (ref 1.5–7.7)
NEUTROPHILS NFR BLD AUTO: 78 %
NITRITE UR QL STRIP.AUTO: NEGATIVE
OSMOLALITY SERPL CALC.SUM OF ELEC: 282 MOSM/KG (ref 275–295)
PH UR: 7 (ref 5–8)
PLATELET # BLD AUTO: 495 10(3)UL (ref 150–450)
POTASSIUM SERPL-SCNC: 3 MMOL/L (ref 3.5–5.1)
PROT SERPL-MCNC: 7.7 G/DL (ref 5.7–8.2)
PROT UR-MCNC: 30 MG/DL
RBC # BLD AUTO: 5.79 X10(6)UL (ref 3.8–5.3)
SODIUM SERPL-SCNC: 136 MMOL/L (ref 136–145)
SP GR UR STRIP: >1.03 (ref 1–1.03)
UROBILINOGEN UR STRIP-ACNC: 12
WBC # BLD AUTO: 16.2 X10(3) UL (ref 4–11)

## 2025-07-26 PROCEDURE — 81001 URINALYSIS AUTO W/SCOPE: CPT | Performed by: EMERGENCY MEDICINE

## 2025-07-26 PROCEDURE — 85025 COMPLETE CBC W/AUTO DIFF WBC: CPT | Performed by: EMERGENCY MEDICINE

## 2025-07-26 PROCEDURE — 81025 URINE PREGNANCY TEST: CPT

## 2025-07-26 PROCEDURE — 96361 HYDRATE IV INFUSION ADD-ON: CPT

## 2025-07-26 PROCEDURE — 76705 ECHO EXAM OF ABDOMEN: CPT | Performed by: EMERGENCY MEDICINE

## 2025-07-26 PROCEDURE — 87086 URINE CULTURE/COLONY COUNT: CPT | Performed by: EMERGENCY MEDICINE

## 2025-07-26 PROCEDURE — 99284 EMERGENCY DEPT VISIT MOD MDM: CPT

## 2025-07-26 PROCEDURE — 80053 COMPREHEN METABOLIC PANEL: CPT | Performed by: EMERGENCY MEDICINE

## 2025-07-26 PROCEDURE — 83690 ASSAY OF LIPASE: CPT | Performed by: EMERGENCY MEDICINE

## 2025-07-26 PROCEDURE — 96374 THER/PROPH/DIAG INJ IV PUSH: CPT

## 2025-07-26 RX ORDER — ONDANSETRON 4 MG/1
4 TABLET, ORALLY DISINTEGRATING ORAL EVERY 4 HOURS PRN
Qty: 10 TABLET | Refills: 0 | Status: SHIPPED | OUTPATIENT
Start: 2025-07-26 | End: 2025-08-02

## 2025-07-26 RX ORDER — PROCHLORPERAZINE MALEATE 10 MG
5 TABLET ORAL EVERY 6 HOURS PRN
COMMUNITY

## 2025-07-26 RX ORDER — OMEPRAZOLE 20 MG/1
20 CAPSULE, DELAYED RELEASE ORAL
COMMUNITY

## 2025-07-26 RX ORDER — ONDANSETRON 2 MG/ML
4 INJECTION INTRAMUSCULAR; INTRAVENOUS ONCE
Status: COMPLETED | OUTPATIENT
Start: 2025-07-26 | End: 2025-07-26

## 2025-07-26 RX ORDER — POTASSIUM CHLORIDE 1500 MG/1
40 TABLET, EXTENDED RELEASE ORAL ONCE
Status: COMPLETED | OUTPATIENT
Start: 2025-07-26 | End: 2025-07-26

## 2025-07-26 NOTE — DISCHARGE INSTRUCTIONS
Take Zofran as prescribed, as needed for nausea or vomiting.  Continue omeprazole as previously prescribed.  Follow-up with a primary care physician for further evaluation and treatment.  Return to the emergency department if repeated vomiting despite medications, increased abdominal pain, or other new symptoms develop.

## 2025-07-26 NOTE — ED NOTES
Pt received in er due to ab pain nausea since Tuesday. Pt states she felt pain in abdomen after eating [employer]. Pt last bm was this morning. Pt states the BM did not have stool just mucus. Pt c/o constipation. Pt denies urinary symptoms or abnormal vaginal dc. Pt denies any other symptoms at this time. Pt states she was seen out of town for same issue and a cyst was found on her CT exam while in [place]. Pt in gown and on monitor at this time. Pt aox4 ambu w/o assist. Family at side.

## 2025-07-26 NOTE — ED INITIAL ASSESSMENT (HPI)
Patient presents to ED via triage, ambulatory, with c/o abdominal pain, vomiting, nausea x 5 days after eating [employer]. Patient [place] ED [place] state for [place] issue, had a CT, where a cyst on her ovary was found. Patient states sx have worsened and advised to come to ED for eval. Denies fever. Denies diarrhea. Last bowel movement Tuesday.

## 2025-07-26 NOTE — ED PROVIDER NOTES
Patient Seen in: [facility] Emergency Department        History  Chief Complaint   Patient presents with    Abdomen/Flank Pain    Vomiting     Stated Complaint: vomiting    Subjective:   HPI            20-year-old female without significant past medical history presents with complaints of persistent nausea and vomiting.  The patient reports onset of symptoms 4 days ago.  She states she has had persistent nausea and vomiting along with diffuse abdominal pain.  She was seen in an outside emergency department the day of the onset of symptoms.  She states she had a CT of her abdomen/pelvis and was told she had an ovarian cyst.  She was given omeprazole and Compazine.  She states that she has been taking the omeprazole but has not taken any Compazine.  She reports continued vomiting along with decreased urine output and little stool output.  She denies any diarrhea.  No known fevers.  She has had similar episodes multiple times in the past and has been seen in this emergency department previously.  She denies cannabis use.      Objective:     History reviewed. No pertinent past medical history.           History reviewed. No pertinent surgical history.             Social History     Socioeconomic History    Marital status: Life Partner   Tobacco Use    Smoking status: Never    Smokeless tobacco: Never   Vaping Use    Vaping status: Never Used   Substance and Sexual Activity    Alcohol use: Never    Drug use: Not Currently     Types: Cannabis                                Physical Exam    ED Triage Vitals [07/26/25 1215]   /89   Pulse 82   Resp 20   Temp 98.3 °F (36.8 °C)   Temp src Temporal   SpO2 97 %   O2 Device None (Room air)       Current Vitals:   Vital Signs  BP: (!) 130/92  Pulse: 59  Resp: 21  Temp: 98.3 °F (36.8 °C)  Temp src: Temporal  MAP (mmHg): (!) 106    Oxygen Therapy  SpO2: 95 %  O2 Device: None (Room air)            Physical Exam    General Appearance: alert, no distress  Eyes: pupils  equal and round no pallor or injection  ENT, Mouth: mucous membranes moist  Respiratory: there are no retractions, lungs are clear to auscultation  Cardiovascular: regular rate and rhythm  Gastrointestinal:  abdomen is soft with mild tenderness to the epigastric area and across the lower abdomen, no masses, bowel sounds normal  Neurological: Speech normal.  Moving extremities x 4.  Skin: warm and dry, no rashes.  Musculoskeletal: neck is supple non tender        Extremities are symmetrical, full range of motion  Psychiatric: patient is oriented X 3, there is no agitation    DIFFERENTIAL DIAGNOSIS: After history and physical exam differential diagnosis was considered for gastritis, electrolyte imbalance, intra-abdominal infection, or other            ED Course  Labs Reviewed   COMP METABOLIC PANEL (14) - Abnormal; Notable for the following components:       Result Value    Glucose 117 (*)     Potassium 3.0 (*)     BUN/CREA Ratio 9.9 (*)     ALT 98 (*)     AST 52 (*)     Bilirubin, Total 1.7 (*)     Albumin 5.1 (*)     All other components within normal limits   CBC WITH DIFFERENTIAL WITH PLATELET - Abnormal; Notable for the following components:    WBC 16.2 (*)     RBC 5.79 (*)     HGB 16.4 (*)     MCV 78.4 (*)     RDW-SD 34.5 (*)     .0 (*)     Neutrophil Absolute Prelim 12.64 (*)     Neutrophil Absolute 12.64 (*)     All other components within normal limits   URINALYSIS WITH CULTURE REFLEX - Abnormal; Notable for the following components:    Clarity Urine Turbid (*)     Spec Gravity >1.030 (*)     Ketones Urine >150 (*)     Protein Urine 30 (*)     Urobilinogen Urine 12 (*)     Leukocyte Esterase Urine 75 (*)     WBC Urine 6-10 (*)     Bacteria Urine 3+ (*)     Squamous Epi. Cells Many (*)     All other components within normal limits   ICTOTEST - Abnormal; Notable for the following components:    Ictotest Positive (*)     All other components within normal limits   LIPASE - Normal   POCT PREGNANCY URINE -  Normal   RAINBOW DRAW LAVENDER   RAINBOW DRAW LIGHT GREEN   RAINBOW DRAW BLUE   RAINBOW DRAW GOLD   URINE CULTURE, ROUTINE                          MDM     Lab results noted.  Patient with leukocytosis similar to prior visits to this emergency department.  Ultrasound results noted.  Patient improved post Zofran and IV fluids.  Tolerating p.o. fluids well in the ED.  Will discharge home with antiemetics and primary care follow-up.  She is advised to return if repeated vomiting or increased abdominal pain develops.        Medical Decision Making      Disposition and Plan     Clinical Impression:  1. Nausea and vomiting, unspecified vomiting type         Disposition:  Discharge  7/26/2025  2:41 pm    Follow-up:  Anny Tatum MD  [address] [place] [id number]  [phone]    Follow up            Medications Prescribed:  Current Discharge Medication List        START taking these medications    Details   ondansetron 4 MG Oral Tablet Dispersible Take 1 tablet (4 mg total) by mouth every 4 (four) hours as needed for Nausea.  Qty: 10 tablet, Refills: 0                   Supplementary Documentation:

## (undated) NOTE — LETTER
Date & Time: 7/26/2025, 2:41 PM  Patient: Rosetta Nobles  Encounter Provider(s):    Kelby Phillips MD       To Whom It May Concern:    Rosetta Nobles was seen and treated in our department on 7/26/2025. She should not return to work until 7/28/2025.    If you have any questions or concerns, please do not hesitate to call.        _____________________________  Physician/APC Signature

## (undated) NOTE — LETTER
Date & Time: 11/25/2024, 1:13 AM  Patient: Rosetta Nobles  Encounter Provider(s):    Walter Johnson MD       To Whom It May Concern:    Rosetta Nobles was seen and treated in our department on 11/24/2024. She should not return to work until 11/27/24 .    If you have any questions or concerns, please do not hesitate to call.        _____________________________  Physician/APC Signature